# Patient Record
Sex: FEMALE | Race: WHITE | NOT HISPANIC OR LATINO | Employment: UNEMPLOYED | URBAN - METROPOLITAN AREA
[De-identification: names, ages, dates, MRNs, and addresses within clinical notes are randomized per-mention and may not be internally consistent; named-entity substitution may affect disease eponyms.]

---

## 2017-07-27 ENCOUNTER — CONVERSION ENCOUNTER (OUTPATIENT)
Dept: MAMMOGRAPHY | Facility: HOSPITAL | Age: 47
End: 2017-07-27

## 2018-08-13 ENCOUNTER — CONVERSION ENCOUNTER (OUTPATIENT)
Dept: PODIATRY | Facility: CLINIC | Age: 48
End: 2018-08-13

## 2018-08-13 ENCOUNTER — OFFICE VISIT CONVERTED (OUTPATIENT)
Dept: PODIATRY | Facility: CLINIC | Age: 48
End: 2018-08-13
Attending: PODIATRIST

## 2018-08-28 ENCOUNTER — OFFICE VISIT CONVERTED (OUTPATIENT)
Dept: PODIATRY | Facility: CLINIC | Age: 48
End: 2018-08-28
Attending: PODIATRIST

## 2018-12-31 ENCOUNTER — CONVERSION ENCOUNTER (OUTPATIENT)
Dept: ORTHOPEDIC SURGERY | Facility: CLINIC | Age: 48
End: 2018-12-31

## 2018-12-31 ENCOUNTER — OFFICE VISIT CONVERTED (OUTPATIENT)
Dept: ORTHOPEDIC SURGERY | Facility: CLINIC | Age: 48
End: 2018-12-31
Attending: PHYSICIAN ASSISTANT

## 2019-01-17 ENCOUNTER — HOSPITAL ENCOUNTER (OUTPATIENT)
Dept: MAMMOGRAPHY | Facility: HOSPITAL | Age: 49
Discharge: HOME | End: 2019-01-17
Attending: NURSE PRACTITIONER

## 2019-02-15 ENCOUNTER — HOSPITAL ENCOUNTER (OUTPATIENT)
Dept: OTHER | Facility: HOSPITAL | Age: 49
Discharge: HOME | End: 2019-02-15
Attending: NURSE PRACTITIONER

## 2019-02-18 LAB
AMOXICILLIN+CLAV SUSC ISLT: <=2
AMPICILLIN SUSC ISLT: <=2
AMPICILLIN+SULBAC SUSC ISLT: <=2
BACTERIA UR CULT: ABNORMAL
CEFAZOLIN SUSC ISLT: <=4
CEFEPIME SUSC ISLT: <=1
CEFTAZIDIME SUSC ISLT: <=1
CEFTRIAXONE SUSC ISLT: <=1
CEFUROXIME ORAL SUSC ISLT: <=1
CEFUROXIME PARENTER SUSC ISLT: <=1
CIPROFLOXACIN SUSC ISLT: <=0.25
ERTAPENEM SUSC ISLT: <=0.5
GENTAMICIN SUSC ISLT: <=1
LEVOFLOXACIN SUSC ISLT: <=0.12
NITROFURANTOIN SUSC ISLT: <=16
TETRACYCLINE SUSC ISLT: <=1
TMP SMX SUSC ISLT: <=20
TOBRAMYCIN SUSC ISLT: <=1

## 2019-06-05 ENCOUNTER — HOSPITAL ENCOUNTER (OUTPATIENT)
Dept: OTHER | Facility: HOSPITAL | Age: 49
Discharge: HOME | End: 2019-06-05
Attending: NURSE PRACTITIONER

## 2019-06-05 LAB
APPEARANCE UR: ABNORMAL
BILIRUB UR QL: NEGATIVE
COLOR UR: YELLOW
CONV BACTERIA: ABNORMAL
CONV COLLECTION SOURCE (UA): ABNORMAL
CONV HYALINE CASTS IN URINE MICRO: ABNORMAL /[LPF]
CONV UROBILINOGEN IN URINE BY AUTOMATED TEST STRIP: 0.2 {EHRLICHU}/DL (ref 0.1–1)
GLUCOSE UR QL: NEGATIVE MG/DL
HGB UR QL STRIP: ABNORMAL
KETONES UR QL STRIP: NEGATIVE MG/DL
LEUKOCYTE ESTERASE UR QL STRIP: ABNORMAL
NITRITE UR QL STRIP: NEGATIVE
PH UR STRIP.AUTO: 5 [PH] (ref 5–8)
PROT UR QL: NEGATIVE MG/DL
RBC #/AREA URNS HPF: ABNORMAL /[HPF]
SP GR UR: 1.02 (ref 1–1.03)
SQUAMOUS SPT QL MICRO: ABNORMAL /[HPF]
WBC #/AREA URNS HPF: ABNORMAL /[HPF]

## 2019-06-07 LAB — BACTERIA UR CULT: NORMAL

## 2019-09-04 ENCOUNTER — HOSPITAL ENCOUNTER (OUTPATIENT)
Dept: OTHER | Facility: HOSPITAL | Age: 49
Discharge: HOME | End: 2019-09-04

## 2019-09-05 LAB
C TRACH RRNA CVX QL NAA+PROBE: NOT DETECTED
N GONORRHOEA DNA SPEC QL NAA+PROBE: NOT DETECTED

## 2019-09-06 LAB
CONV HEPATITIS B SURFACE AG W CONFIRMATION RE: NEGATIVE
CONV HEPATITIS COMMENT: NORMAL
HBV CORE AB SER DONR QL IA: NEGATIVE
HBV CORE IGM SERPL QL IA: NEGATIVE
HBV E AB SERPL QL IA: NEGATIVE
HBV E AG SERPL QL IA: NEGATIVE
HBV SURFACE AB SER QL: REACTIVE
HCV AB S/CO SERPL IA: <0.1 S/CO RATIO (ref 0–0.9)

## 2019-09-07 LAB
CONV HIV COMBO AG/AB (HIV-1/O/2) WITH REFLEX: NEGATIVE
CONV TREPONEMA PALLIDUM (RPR) WITH FTA-ABS, TP-PA REFLEXES: NON REACTIVE

## 2019-10-07 ENCOUNTER — HOSPITAL ENCOUNTER (OUTPATIENT)
Dept: OTHER | Facility: HOSPITAL | Age: 49
Discharge: HOME | End: 2019-10-07
Attending: NURSE PRACTITIONER

## 2019-10-21 ENCOUNTER — OFFICE VISIT CONVERTED (OUTPATIENT)
Dept: UROLOGY | Facility: CLINIC | Age: 49
End: 2019-10-21
Attending: NURSE PRACTITIONER

## 2020-01-16 ENCOUNTER — HOSPITAL ENCOUNTER (OUTPATIENT)
Dept: OTHER | Facility: HOSPITAL | Age: 50
Discharge: HOME | End: 2020-01-16
Attending: NURSE PRACTITIONER

## 2020-01-18 LAB — BACTERIA UR CULT: NORMAL

## 2020-02-12 ENCOUNTER — HOSPITAL ENCOUNTER (OUTPATIENT)
Dept: URGENT CARE | Facility: CLINIC | Age: 50
Discharge: HOME | End: 2020-02-12

## 2020-08-22 ENCOUNTER — HOSPITAL ENCOUNTER (OUTPATIENT)
Dept: URGENT CARE | Facility: CLINIC | Age: 50
Discharge: HOME | End: 2020-08-22
Attending: FAMILY MEDICINE

## 2020-08-24 ENCOUNTER — OFFICE VISIT CONVERTED (OUTPATIENT)
Dept: SURGERY | Facility: CLINIC | Age: 50
End: 2020-08-24
Attending: SURGERY

## 2020-08-24 LAB — BACTERIA UR CULT: NORMAL

## 2020-10-13 ENCOUNTER — HOSPITAL ENCOUNTER (OUTPATIENT)
Dept: MAMMOGRAPHY | Facility: HOSPITAL | Age: 50
Discharge: HOME | End: 2020-10-13
Attending: NURSE PRACTITIONER

## 2021-04-01 ENCOUNTER — HOSPITAL ENCOUNTER (OUTPATIENT)
Dept: OTHER | Facility: HOSPITAL | Age: 51
Discharge: HOME | End: 2021-04-01
Attending: INTERNAL MEDICINE

## 2021-04-01 LAB — PTH-INTACT SERPL-MCNC: 45.5 PG/ML (ref 11.1–79.5)

## 2021-05-10 NOTE — H&P
"   History and Physical      Patient Name: Faina Braun   Patient ID: [id number]   Sex: Female   YOB: 1970    Primary Care Provider: Charlotte BUCKNER   Referring Provider: Charlotte BUCKNER    Visit Date: August 24, 2020    Provider: Osei Weinstein MD   [facility]   Location Address: [address] [place] [address]  [id number]   Location Phone: [phone]          Chief Complaint  · Skin Lesion      History Of Present Illness  Faina Braun is a 49 year old Other Race,  or  female who presents to the office today as a consult from Charlotte BUCKNER.      Patient has an area at her upper back that is swollen and tender.  Swelling has went down quite a bit after she applied heat pads.  No fevers.  Mild redness at the area of concern.       Past Medical History  Disease Name Date Onset Notes   Cancer --  Ovarian caner per pt, states \"thats why I had a hysterectomy\"   Degeneration of lumbar intervertebral disc 01/12/2015 L5/S1   Depression --  --    Foot pain, left 08/13/2018 --    Foot pain, right 08/13/2018 --    Ingrowing nail 08/28/2018 --    Ingrowing toenail --  --    Insomnia --  --    Lumbago/low back pain --  --    Onychia and paronychia of toe 08/13/2018 --    Urinary leakage --  --          Past Surgical History  Procedure Name Date Notes   Cesarian Section --  --    Hysterectomy --  --    Hysterectomy-Abdominal --  --    Joint Surgery --  --    Leg Surgery --  --    Shoulder surgery --  --          Medication List  Name Date Started Instructions   Cymbalta 30 mg oral capsule,delayed release(/EC) 12/01/2014 [facility]         Allergy List  Allergen Name Date Reaction Notes   CT Contrast Dye --  --  --    I.V. Dye --  --  --    sulfamethoxazole-trimethoprim --  --  --          Family Medical History  Disease Name Relative/Age Notes   Cancer, Unspecified  --    Diabetes, unspecified type Father/   Father  Aunt " "(paternal)   Prostate Cancer Grandfather (paternal)/   Grandfather (paternal)   Family history of colon cancer Grandfather (paternal)/80s   Aunt/70s; Grandfather (paternal)/80s   Family history of breast cancer  Aunt/70s   Osteoporosis Mother/   Mother         Social History  Finding Status Start/Stop Quantity Notes   Alcohol Never --/-- --  drinks no   Alcohol Use Never --/-- --  does not drink   Caffeine Current every day --/-- --  drinks regularly; 1-2 times per day   . --  --/-- --  --    lives with other --  --/-- --  --    Recreational Drug Use Never --/-- --  no   Retired. --  --/-- --  --    Tobacco Current some day --/-- 0.5 PPD current some day smoker, 0.5 pack per day, smoked 21-30 years  current everyday smoker; quit smoking at age 19         Review of Systems  · Constitutional  o Denies  o : fever, chills  · Cardiovascular  o Denies  o : chest pain on exertion  · Respiratory  o Denies  o : shortness of breath, cough  · Gastrointestinal  o Denies  o : nausea, vomiting  · Integument  o Admits  o : skin lesion or lump      Vitals  Date Time BP Position Site L\R Cuff Size HR RR TEMP (F) WT  HT  BMI kg/m2 BSA m2 O2 Sat        08/24/2020 12:54 PM       14  178lbs 6oz 5'  3\" 31.6 1.9           Physical Examination  · Constitutional  o Appearance  o : healthy appearing, alert and in no acute distress, reliable historian  · Head and Face  o Head  o :   § Inspection  § : no visable deformities or lesions  · Eyes  o Conjunctivae  o : clear  o Sclerae  o : clear  · Neck  o Inspection/Palpation  o : normal appearance, no masses, trachea midline  · Respiratory  o Respiratory Effort  o : breathing unlabored, respiratory effort appears normal  o Inspection of Chest  o : normal appearance, no retractions  · Cardiovascular  o Heart  o : regular rate and rhythm  · Gastrointestinal  o Abdominal Examination  o :   § Abdomen  § : soft, nontender, nondistended  · Skin and Subcutaneous Tissue  o General " Inspection  o : At the center of the patient's upper back, there is an area of mild swelling and erythema with a small punctate at the center of the swelling and erythema. The swelling covers an area about 2 cm in size. The area of erythema is located where a tattoo is located.  · Neurologic  o Cranial Nerves  o : no obvious motor deficits  o Sensation  o : no obvious sensory deficits  o Gait and Station  o :   § Gait Screening  § : normal gait, able to stand without diffculty  o Cerebellar Function  o : no obvious abnormalities  · Psychiatric  o Judgement and Insight  o : judgment and insight intact  o Mood and Affect  o : mood normal, affect appropriate          Assessment  · Pre-Surgical Orders     V72.84  · Pain     780.96/R52  location is back  · Inflamed sebaceous cyst     706.2/L72.3  · Preop testing     V72.84/Z01.818    Problems Reconciled  Plan  · Orders  o GENERAL SURGERY (GNSUR) - V72.84 - 09/02/2020  o [facility] Pre-Op Covid-19 Screening (14130) - V72.84, 706.2/L72.3, 780.96/R52, V72.84/Z01.818 - 08/28/2020   at 315  · Medications  o Medications have been Reconciled  o Transition of Care or Provider Policy  · Instructions  o PLAN: excision of inflamed sebaceous cyst from back  o PLEASE SIGN PERMIT FOR: excision of inflamed sebaceous cyst from back  o Anesthesia: MAC  o Outpatient  o O.R. PREP: Per protocol  o IV: Per Anesthesia  o SCD's preoperatively  o *__Cefazolin 2 gram IV on call to OR.  o The indications, options, risks, benefits, and expected outcomes of the planned procedure were discussed with the patient and the patient agrees to proceed.   o Electronically Identified Patient Education Materials Provided Electronically     The patient fully understands that the incision to remove the sebaceous cyst will have to go through one of her tattoos.             Electronically Signed by: Supriya Mario-Luz Maria, -Author on September 9, 2020 11:20:30 AM  Electronically Co-signed by: Osei Weinstein MD  -Reviewer on September 9, 2020 01:56:30 PM

## 2021-05-14 VITALS — HEIGHT: 63 IN | WEIGHT: 178.37 LBS | BODY MASS INDEX: 31.61 KG/M2 | RESPIRATION RATE: 14 BRPM

## 2021-05-15 VITALS
DIASTOLIC BLOOD PRESSURE: 45 MMHG | TEMPERATURE: 98 F | HEART RATE: 86 BPM | SYSTOLIC BLOOD PRESSURE: 107 MMHG | HEIGHT: 63 IN

## 2021-05-15 VITALS — OXYGEN SATURATION: 98 % | BODY MASS INDEX: 32.82 KG/M2 | HEART RATE: 98 BPM | HEIGHT: 63 IN | WEIGHT: 185.25 LBS

## 2021-05-16 VITALS
HEART RATE: 86 BPM | HEIGHT: 63 IN | DIASTOLIC BLOOD PRESSURE: 53 MMHG | OXYGEN SATURATION: 98 % | BODY MASS INDEX: 31.71 KG/M2 | WEIGHT: 179 LBS | SYSTOLIC BLOOD PRESSURE: 113 MMHG

## 2021-05-16 VITALS — WEIGHT: 179 LBS | BODY MASS INDEX: 31.71 KG/M2 | OXYGEN SATURATION: 97 % | HEIGHT: 63 IN | HEART RATE: 90 BPM

## 2021-06-17 ENCOUNTER — TRANSCRIBE ORDERS (OUTPATIENT)
Dept: ADMINISTRATIVE | Facility: HOSPITAL | Age: 51
End: 2021-06-17

## 2021-06-17 DIAGNOSIS — E55.9 VITAMIN D DEFICIENCY: Primary | ICD-10-CM

## 2021-09-21 ENCOUNTER — TRANSCRIBE ORDERS (OUTPATIENT)
Dept: ADMINISTRATIVE | Facility: HOSPITAL | Age: 51
End: 2021-09-21

## 2021-09-21 ENCOUNTER — LAB REQUISITION (OUTPATIENT)
Dept: LAB | Facility: HOSPITAL | Age: 51
End: 2021-09-21

## 2021-09-21 DIAGNOSIS — R31.9 HEMATURIA, UNSPECIFIED: ICD-10-CM

## 2021-09-21 DIAGNOSIS — R35.0 FREQUENCY OF MICTURITION: ICD-10-CM

## 2021-09-21 DIAGNOSIS — Z12.31 ENCOUNTER FOR SCREENING MAMMOGRAM FOR MALIGNANT NEOPLASM OF BREAST: Primary | ICD-10-CM

## 2021-09-21 LAB
BACTERIA UR QL AUTO: ABNORMAL /HPF
BILIRUB UR QL STRIP: NEGATIVE
CLARITY UR: CLEAR
COLOR UR: YELLOW
GLUCOSE UR STRIP-MCNC: NEGATIVE MG/DL
HGB UR QL STRIP.AUTO: ABNORMAL
HYALINE CASTS UR QL AUTO: ABNORMAL /LPF
KETONES UR QL STRIP: NEGATIVE
LEUKOCYTE ESTERASE UR QL STRIP.AUTO: NEGATIVE
NITRITE UR QL STRIP: NEGATIVE
PH UR STRIP.AUTO: <=5 [PH] (ref 5–8)
PROT UR QL STRIP: NEGATIVE
RBC # UR: ABNORMAL /HPF
REF LAB TEST METHOD: ABNORMAL
SP GR UR STRIP: 1.02 (ref 1–1.03)
SQUAMOUS #/AREA URNS HPF: ABNORMAL /HPF
UROBILINOGEN UR QL STRIP: ABNORMAL
WBC UR QL AUTO: ABNORMAL /HPF

## 2021-09-21 PROCEDURE — 81001 URINALYSIS AUTO W/SCOPE: CPT | Performed by: NURSE PRACTITIONER

## 2021-11-30 ENCOUNTER — HOSPITAL ENCOUNTER (OUTPATIENT)
Dept: MAMMOGRAPHY | Facility: HOSPITAL | Age: 51
Discharge: HOME | End: 2021-11-30
Admitting: NURSE PRACTITIONER

## 2021-11-30 DIAGNOSIS — Z12.31 ENCOUNTER FOR SCREENING MAMMOGRAM FOR MALIGNANT NEOPLASM OF BREAST: ICD-10-CM

## 2021-11-30 PROCEDURE — 77063 BREAST TOMOSYNTHESIS BI: CPT

## 2021-11-30 PROCEDURE — 77067 SCR MAMMO BI INCL CAD: CPT

## 2022-05-04 ENCOUNTER — CLINICAL SUPPORT (OUTPATIENT)
Dept: GASTROENTEROLOGY | Facility: CLINIC | Age: 52
End: 2022-05-04

## 2023-01-06 PROCEDURE — 87086 URINE CULTURE/COLONY COUNT: CPT | Performed by: NURSE PRACTITIONER

## 2023-01-06 PROCEDURE — 87088 URINE BACTERIA CULTURE: CPT | Performed by: NURSE PRACTITIONER

## 2023-01-06 PROCEDURE — 87186 SC STD MICRODIL/AGAR DIL: CPT | Performed by: NURSE PRACTITIONER

## 2024-04-18 ENCOUNTER — OFFICE VISIT (OUTPATIENT)
Dept: PLASTIC SURGERY | Facility: CLINIC | Age: 54
End: 2024-04-18

## 2024-04-18 VITALS — HEIGHT: 63 IN | BODY MASS INDEX: 31.33 KG/M2

## 2024-04-18 DIAGNOSIS — R23.8 FACIAL AGING: Primary | ICD-10-CM

## 2024-04-18 RX ORDER — LEVOTHYROXINE SODIUM 0.05 MG/1
1 TABLET ORAL DAILY
COMMUNITY
Start: 2024-02-20

## 2024-04-18 RX ORDER — DULOXETIN HYDROCHLORIDE 30 MG/1
1 CAPSULE, DELAYED RELEASE ORAL DAILY
COMMUNITY
Start: 2024-02-19

## 2024-04-18 RX ORDER — PRAZOSIN HYDROCHLORIDE 2 MG/1
2 CAPSULE ORAL
COMMUNITY
Start: 2024-03-01

## 2024-04-18 NOTE — PROGRESS NOTES
"Chief Complaint  Consult and Botulinum Toxin Injection    Subjective  I want to control the wrinkles and my lips to look spann        History of Present Illness  Faina Braun is a 53 y.o. female who presents to [facility] as a consult for botox injection. She complains of fine lines and wrinkles on forehead and glabella. Has some asymmetry on right side of her upper lip. Has never had botox or  past.       Allergies: Ct contrast, Iodinated contrast media, and Sulfamethoxazole-trimethoprim  Allergies Reconciled.    Review of Systems   Constitutional: Negative.    HENT: Negative.     Eyes:         Glasses   Respiratory: Negative.     Cardiovascular: Negative.    Gastrointestinal: Negative.    Endocrine: Negative.    Genitourinary: Negative.    Musculoskeletal: Negative.    Skin: Negative.    Allergic/Immunologic: Negative.    Neurological: Negative.    Hematological: Negative.    Psychiatric/Behavioral: Negative.        All review of system has been reviewed and it  is negative except the ones note above.     Objective     Ht 160 cm (62.99\")   BMI 31.33 kg/m²     Body mass index is 31.33 kg/m².    Physical Exam   Cardiovascular: Normal rate.     Pulmonary/Chest  Effort normal.     Head and Face  Dynamic greater than static rhytids of forehead, glabella, and periorbital areas, no open areas or irritation     Discussed facial movement, periorbital areas are smooth with minimal smile lines, did not recommend Botox at periorbitals at this time. Discussed treating Forehead and glabella as she has significant muscle contracture in these areas. She is also concerned with NLF and lips. She is very self conscious of lips as upper lip is asymmetric    Result Review :       Skin was cleansed with alcohol and then   1% lidocaine nerve mental nerve block was done before filler injections. She was allowed to numb, Botox was injected to forehead and glabella while " she was getting numb.  Filler Injection    Performed by: No Marks APRN  Authorized by: No Marks APRN       Ultra filler is what was injected.    The indications, benefits, risks, alternatives, expected outcomes and complications in regards to the injection of Ultra filler was discussed with the patient.    1st Ultra Lot #:  [id number]    1st Ultra Expiration date:  11/10/2024    1st Ultra Number of syringes:  1  I then proceeded to inject the Lower lips and Upper lips with filler, taking care to aspirate prior to injection in small aliquots.   Ice packs were applied    Post-procedure instructions were provided     informed consent was obtained   Patient expressed understanding, accepts risks, consent and wishes to proceed  Site prep:  Alcohol  Toleration of procedure:  Well, with no immediate complications, Appropriate bruising and Appropriate swelling  Filler Injection    Performed by: No Marks APRN  Authorized by: No Marks APRN       Ultra Plus filler is what was injected.    The indications, benefits, risks, alternatives, expected outcomes and complications in regards to the injection of Ultra Plus filler was discussed with the patient.    1st Ultra Plus Lot #:  [id number]    1st Ultra Plus Expiration Date:  12/4/2024    1st Ultra Plus Number of syringes:  1  I then proceeded to inject the Prominent NL folds with filler, taking care to aspirate prior to injection in small aliquots.   Ice packs were applied    Post-procedure instructions were provided     informed consent was obtained   Patient expressed understanding, accepts risks, consent and wishes to proceed  Site prep:  Alcohol  Toleration of procedure:  Well, with no immediate complications, Appropriate bruising and Appropriate swelling  Photos were obtained.  The indications, benefits, risks, alternatives, expected outcomes, and complications as to the application of Botulinum toxin/Dysport to the  face was discussed with the patient.  Informed consent was obtained.  They understand, accept risks, consent and wish to proceed.   Description:  In the exam room, patient's face was prepped with alcohol.  Using Botox glabella, and forehead were injected.  The patient tolerated the procedure well with no immediate complications.  Post procedure instructions were given.  After injection, the areas were gently massaged. The patient was given an icepack.  Lot#: [id number]  Exp#: 07/2026  NDC: Botox: 0927-7745-13  Total Units Used: 40 units  Forehead: 20 units  Glabella: 20 units      Assessment and Plan      Diagnoses and all orders for this visit:    1. Facial aging (Primary)      Plan:  The patient tolerated the procedure well with no immediate complications.   The patient was given an ice pack and post procedure instructions.   May call office with any concerns or questions.  Aware the filler sale is to the end of May, she should wait 2-4 weeks before more filler  RTC 12 weeks for next Botox injection  patient instructed to call office for any questions or concerns and verbalized understanding of all instructions given.      Scribed by Dinora Wolfe MA, acting as a scribe for DUDLEY Gonzalez, 04/19/24 14:37 EDT.  DUDLEY Gonzalez's signature on the note affirms that the note adequately documents the care provided.       Patient was given instructions and counseling regarding her condition. Please see specific information pulled into the AVS if appropriate.     Dinora Wolfe MA  04/18/2024

## 2024-04-19 PROBLEM — R23.8 FACIAL AGING: Status: ACTIVE | Noted: 2024-04-19

## 2024-04-26 ENCOUNTER — PATIENT ROUNDING (BHMG ONLY) (OUTPATIENT)
Dept: PLASTIC SURGERY | Facility: CLINIC | Age: 54
End: 2024-04-26
Payer: MEDICARE

## 2024-04-26 ENCOUNTER — PATIENT MESSAGE (OUTPATIENT)
Dept: PLASTIC SURGERY | Facility: CLINIC | Age: 54
End: 2024-04-26
Payer: MEDICARE

## 2024-04-26 NOTE — PROGRESS NOTES
[employer] message has been sent to the patient for PATIENT ROUNDING with [facility].

## 2024-04-30 ENCOUNTER — TELEPHONE (OUTPATIENT)
Dept: PLASTIC SURGERY | Facility: CLINIC | Age: 54
End: 2024-04-30
Payer: MEDICARE

## 2024-04-30 NOTE — TELEPHONE ENCOUNTER
Spoke with pt and walked through steps on sending requested pictures.  Pictures were received via [employer].

## 2024-04-30 NOTE — TELEPHONE ENCOUNTER
Called patient and had to leave voicemail for patient to see if she could send updated pictures through mychart of her lips so we can be prepared to add more filler on Thursday or dissolve it the rest of the way out. Patient is aware to call the office with any concerns.

## 2024-05-01 NOTE — PROGRESS NOTES
Chief Complaint  No chief complaint on file.    Subjective  I want to control the wrinkles and my lips to look spann        History of Present Illness  Faina Braun is a 53 y.o. female who presents to [facility] as a consult for botox injection. She complains of fine lines and wrinkles on forehead and glabella. Has some asymmetry on right side of her upper lip. Has never had botox or  past.       Allergies: Ct contrast, Iodinated contrast media, and Sulfamethoxazole-trimethoprim  Allergies Reconciled.    Review of Systems   Constitutional: Negative.    HENT: Negative.     Eyes:         Glasses   Respiratory: Negative.     Cardiovascular: Negative.    Gastrointestinal: Negative.    Endocrine: Negative.    Genitourinary: Negative.    Musculoskeletal: Negative.    Skin: Negative.    Allergic/Immunologic: Negative.    Neurological: Negative.    Hematological: Negative.    Psychiatric/Behavioral: Negative.        All review of system has been reviewed and it  is negative except the ones note above.     Objective     There were no vitals taken for this visit.    There is no height or weight on file to calculate BMI.    Physical Exam   Cardiovascular: Normal rate.     Pulmonary/Chest  Effort normal.     Head and Face  Dynamic greater than static rhytids of forehead, glabella, and periorbital areas, no open areas or irritation     Discussed facial movement, periorbital areas are smooth with minimal smile lines, did not recommend Botox at periorbitals at this time. Discussed treating Forehead and glabella as she has significant muscle contracture in these areas. She is also concerned with NLF and lips. She is very self conscious of lips as upper lip is asymmetric    Result Review :       Skin was cleansed with alcohol and then   1% lidocaine nerve mental nerve block was done before filler injections. She was allowed to numb, Botox was injected to forehead and  glabella while she was getting numb.  Procedures  Photos were obtained.  The indications, benefits, risks, alternatives, expected outcomes, and complications as to the application of Botulinum toxin/Dysport to the face was discussed with the patient.  Informed consent was obtained.  They understand, accept risks, consent and wish to proceed.   Description:  In the exam room, patient's face was prepped with alcohol.  Using Botox glabella, and forehead were injected.  The patient tolerated the procedure well with no immediate complications.  Post procedure instructions were given.  After injection, the areas were gently massaged. The patient was given an icepack.  Lot#: [id number]  Exp#: 07/2026  NDC: Botox: 2471-9642-26  Total Units Used: 40 units  Forehead: 20 units  Glabella: 20 units      Assessment and Plan      There are no diagnoses linked to this encounter.    Plan:  The patient tolerated the procedure well with no immediate complications.   The patient was given an ice pack and post procedure instructions.   May call office with any concerns or questions.  Aware the filler sale is to the end of May, she should wait 2-4 weeks before more filler  RTC 12 weeks for next Botox injection  patient instructed to call office for any questions or concerns and verbalized understanding of all instructions given.      Scribed by Dinora Wolfe MA, acting as a scribe for DUDLEY Gonzalez, 04/19/24 14:37 EDT.  DUDLEY Gonzalez's signature on the note affirms that the note adequately documents the care provided.       Patient was given instructions and counseling regarding her condition. Please see specific information pulled into the AVS if appropriate.     Rahel Hutchins  05/02/2024

## 2024-05-02 ENCOUNTER — OFFICE VISIT (OUTPATIENT)
Dept: PLASTIC SURGERY | Facility: CLINIC | Age: 54
End: 2024-05-02

## 2024-05-02 VITALS
WEIGHT: 182 LBS | SYSTOLIC BLOOD PRESSURE: 114 MMHG | TEMPERATURE: 98.4 F | DIASTOLIC BLOOD PRESSURE: 71 MMHG | OXYGEN SATURATION: 96 % | HEIGHT: 63 IN | BODY MASS INDEX: 32.25 KG/M2 | HEART RATE: 80 BPM

## 2024-05-02 DIAGNOSIS — R23.8 FACIAL AGING: Primary | ICD-10-CM

## 2024-07-03 ENCOUNTER — APPOINTMENT (OUTPATIENT)
Dept: CT IMAGING | Facility: HOSPITAL | Age: 54
End: 2024-07-03
Payer: MEDICARE

## 2024-07-03 ENCOUNTER — HOSPITAL ENCOUNTER (EMERGENCY)
Facility: HOSPITAL | Age: 54
Discharge: HOME | End: 2024-07-03
Attending: EMERGENCY MEDICINE
Payer: MEDICARE

## 2024-07-03 VITALS
TEMPERATURE: 98.5 F | BODY MASS INDEX: 32.27 KG/M2 | SYSTOLIC BLOOD PRESSURE: 100 MMHG | WEIGHT: 182.1 LBS | HEART RATE: 74 BPM | DIASTOLIC BLOOD PRESSURE: 48 MMHG | RESPIRATION RATE: 16 BRPM | HEIGHT: 63 IN | OXYGEN SATURATION: 99 %

## 2024-07-03 DIAGNOSIS — K62.5 RECTAL BLEEDING: ICD-10-CM

## 2024-07-03 DIAGNOSIS — R10.9 ABDOMINAL PAIN, UNSPECIFIED ABDOMINAL LOCATION: Primary | ICD-10-CM

## 2024-07-03 LAB
ABO GROUP BLD: NORMAL
ABO GROUP BLD: NORMAL
ALBUMIN SERPL-MCNC: 4.8 G/DL (ref 3.5–5.2)
ALBUMIN/GLOB SERPL: 1.5 G/DL
ALP SERPL-CCNC: 140 U/L (ref 39–117)
ALT SERPL W P-5'-P-CCNC: 35 U/L (ref 1–33)
ANION GAP SERPL CALCULATED.3IONS-SCNC: 11.8 MMOL/L (ref 5–15)
AST SERPL-CCNC: 30 U/L (ref 1–32)
BASOPHILS # BLD AUTO: 0.07 10*3/MM3 (ref 0–0.2)
BASOPHILS NFR BLD AUTO: 0.5 % (ref 0–1.5)
BILIRUB SERPL-MCNC: 0.5 MG/DL (ref 0–1.2)
BLD GP AB SCN SERPL QL: NEGATIVE
BUN SERPL-MCNC: 15 MG/DL (ref 6–20)
BUN/CREAT SERPL: 15 (ref 7–25)
CALCIUM SPEC-SCNC: 10.2 MG/DL (ref 8.6–10.5)
CHLORIDE SERPL-SCNC: 103 MMOL/L (ref 98–107)
CO2 SERPL-SCNC: 24.2 MMOL/L (ref 22–29)
CREAT SERPL-MCNC: 1 MG/DL (ref 0.57–1)
DEPRECATED RDW RBC AUTO: 41.8 FL (ref 37–54)
EGFRCR SERPLBLD CKD-EPI 2021: 67.1 ML/MIN/1.73
EOSINOPHIL # BLD AUTO: 0.04 10*3/MM3 (ref 0–0.4)
EOSINOPHIL NFR BLD AUTO: 0.3 % (ref 0.3–6.2)
ERYTHROCYTE [DISTWIDTH] IN BLOOD BY AUTOMATED COUNT: 12.6 % (ref 12.3–15.4)
GLOBULIN UR ELPH-MCNC: 3.2 GM/DL
GLUCOSE SERPL-MCNC: 120 MG/DL (ref 65–99)
HCT VFR BLD AUTO: 43.2 % (ref 34–46.6)
HEMOCCULT STL QL IA: POSITIVE
HGB BLD-MCNC: 14.5 G/DL (ref 12–15.9)
HOLD SPECIMEN: NORMAL
HOLD SPECIMEN: NORMAL
IMM GRANULOCYTES # BLD AUTO: 0.08 10*3/MM3 (ref 0–0.05)
IMM GRANULOCYTES NFR BLD AUTO: 0.5 % (ref 0–0.5)
LYMPHOCYTES # BLD AUTO: 1.04 10*3/MM3 (ref 0.7–3.1)
LYMPHOCYTES NFR BLD AUTO: 6.7 % (ref 19.6–45.3)
MCH RBC QN AUTO: 30.5 PG (ref 26.6–33)
MCHC RBC AUTO-ENTMCNC: 33.6 G/DL (ref 31.5–35.7)
MCV RBC AUTO: 90.8 FL (ref 79–97)
MONOCYTES # BLD AUTO: 0.81 10*3/MM3 (ref 0.1–0.9)
MONOCYTES NFR BLD AUTO: 5.2 % (ref 5–12)
NEUTROPHILS NFR BLD AUTO: 13.43 10*3/MM3 (ref 1.7–7)
NEUTROPHILS NFR BLD AUTO: 86.8 % (ref 42.7–76)
NRBC BLD AUTO-RTO: 0 /100 WBC (ref 0–0.2)
PLATELET # BLD AUTO: 352 10*3/MM3 (ref 140–450)
PMV BLD AUTO: 10.2 FL (ref 6–12)
POTASSIUM SERPL-SCNC: 4.4 MMOL/L (ref 3.5–5.2)
PROT SERPL-MCNC: 8 G/DL (ref 6–8.5)
RBC # BLD AUTO: 4.76 10*6/MM3 (ref 3.77–5.28)
RH BLD: POSITIVE
RH BLD: POSITIVE
SODIUM SERPL-SCNC: 139 MMOL/L (ref 136–145)
T&S EXPIRATION DATE: NORMAL
WBC NRBC COR # BLD AUTO: 15.47 10*3/MM3 (ref 3.4–10.8)
WHOLE BLOOD HOLD COAG: NORMAL
WHOLE BLOOD HOLD SPECIMEN: NORMAL

## 2024-07-03 PROCEDURE — 96374 THER/PROPH/DIAG INJ IV PUSH: CPT

## 2024-07-03 PROCEDURE — 86900 BLOOD TYPING SEROLOGIC ABO: CPT | Performed by: EMERGENCY MEDICINE

## 2024-07-03 PROCEDURE — 82274 ASSAY TEST FOR BLOOD FECAL: CPT | Performed by: EMERGENCY MEDICINE

## 2024-07-03 PROCEDURE — 36415 COLL VENOUS BLD VENIPUNCTURE: CPT

## 2024-07-03 PROCEDURE — 25810000003 SODIUM CHLORIDE 0.9 % SOLUTION: Performed by: EMERGENCY MEDICINE

## 2024-07-03 PROCEDURE — 86900 BLOOD TYPING SEROLOGIC ABO: CPT

## 2024-07-03 PROCEDURE — 86850 RBC ANTIBODY SCREEN: CPT | Performed by: EMERGENCY MEDICINE

## 2024-07-03 PROCEDURE — 86901 BLOOD TYPING SEROLOGIC RH(D): CPT | Performed by: EMERGENCY MEDICINE

## 2024-07-03 PROCEDURE — 96375 TX/PRO/DX INJ NEW DRUG ADDON: CPT

## 2024-07-03 PROCEDURE — 99284 EMERGENCY DEPT VISIT MOD MDM: CPT

## 2024-07-03 PROCEDURE — 86901 BLOOD TYPING SEROLOGIC RH(D): CPT

## 2024-07-03 PROCEDURE — 25010000002 MORPHINE PER 10 MG: Performed by: EMERGENCY MEDICINE

## 2024-07-03 PROCEDURE — 85025 COMPLETE CBC W/AUTO DIFF WBC: CPT | Performed by: EMERGENCY MEDICINE

## 2024-07-03 PROCEDURE — 25010000002 ONDANSETRON PER 1 MG: Performed by: EMERGENCY MEDICINE

## 2024-07-03 PROCEDURE — 74176 CT ABD & PELVIS W/O CONTRAST: CPT

## 2024-07-03 PROCEDURE — 80053 COMPREHEN METABOLIC PANEL: CPT | Performed by: EMERGENCY MEDICINE

## 2024-07-03 RX ORDER — DICYCLOMINE HCL 20 MG
20 TABLET ORAL EVERY 6 HOURS
Qty: 28 TABLET | Refills: 0 | Status: SHIPPED | OUTPATIENT
Start: 2024-07-03 | End: 2024-07-10

## 2024-07-03 RX ORDER — SODIUM CHLORIDE 0.9 % (FLUSH) 0.9 %
10 SYRINGE (ML) INJECTION AS NEEDED
Status: DISCONTINUED | OUTPATIENT
Start: 2024-07-03 | End: 2024-07-03 | Stop reason: HOSPADM

## 2024-07-03 RX ORDER — ONDANSETRON 2 MG/ML
4 INJECTION INTRAMUSCULAR; INTRAVENOUS ONCE
Status: COMPLETED | OUTPATIENT
Start: 2024-07-03 | End: 2024-07-03

## 2024-07-03 RX ORDER — ONDANSETRON 4 MG/1
8 TABLET, ORALLY DISINTEGRATING ORAL EVERY 8 HOURS PRN
Qty: 15 TABLET | Refills: 0 | Status: SHIPPED | OUTPATIENT
Start: 2024-07-03 | End: 2024-07-08

## 2024-07-03 RX ADMIN — SODIUM CHLORIDE 1000 ML: 9 INJECTION, SOLUTION INTRAVENOUS at 14:04

## 2024-07-03 RX ADMIN — ONDANSETRON 4 MG: 2 INJECTION INTRAMUSCULAR; INTRAVENOUS at 14:05

## 2024-07-03 RX ADMIN — MORPHINE SULFATE 4 MG: 4 INJECTION, SOLUTION INTRAMUSCULAR; INTRAVENOUS at 14:05

## 2024-07-03 NOTE — DISCHARGE INSTRUCTIONS
Liquid diet only for the next 24 hours and then advance your diet very slowly as tolerated    Please push oral fluids    Please follow-up with your doctor Friday for serial reexamination the abdomen.      Return to the emergency room immediately for intractable pain, intractable vomiting, fever, shaking chills, muscle aches, near passing out, passing out, heavy rectal bleeding, passing large clots, shortness of breath, unusual fatigue or any new symptoms you are concerned with    Please discuss the need for gastroenterology referral and colonoscopy with your primary care physician at your follow-up appointment to determine etiology of the rectal bleeding

## 2024-07-03 NOTE — ED PROVIDER NOTES
Time: 1:51 PM EDT  Date of encounter:  7/3/2024  Independent Historian/Clinical History and Information was obtained by:   Patient  Chief Complaint: Abdominal pain and rectal bleeding    History is limited by: N/A    History of Present Illness:  Patient is a 53 y.o. year old female who presents to the emergency department for evaluation of abdominal pain and rectal bleeding.  The patient notes that she had a large bowel movement today.  She states that there was bleeding associated with bowel movement.  She states after that time she has been bleeding from the rectum without stool.  She does note left lower quadrant abdominal pain.  She has had nausea but no vomiting.  She has had no fever, rigors or myalgias.  She has no bleeding at this time.  She denies any shortness of breath, near syncope, syncope or unusual fatigue.  She has not had similar symptoms previously    HPI    Patient Care Team  Primary Care Provider: Marii Mcpherson MD    Past Medical History:     Allergies   Allergen Reactions    Ct Contrast GI Intolerance    Iodinated Contrast Media GI Intolerance    Sulfamethoxazole-Trimethoprim Rash and Hives     Past Medical History:   Diagnosis Date    Depression     Hyperlipidemia      Past Surgical History:   Procedure Laterality Date     SECTION      HYSTERECTOMY      SHOULDER ARTHROSCOPY      bilateral     History reviewed. No pertinent family history.    Home Medications:  Prior to Admission medications    Medication Sig Start Date End Date Taking? Authorizing Provider   ascorbic acid (VITAMIN C) 500 MG tablet Vitamin C 500 mg oral tablet take 1 tablet by oral route daily   Suspended    Emergency, Nurse JENN Mayers   atorvastatin (LIPITOR) 20 MG tablet Take 1 tablet by mouth every night at bedtime. 3/11/21   Emergency, Nurse JENN Mayers   buPROPion SR (WELLBUTRIN SR) 150 MG 12 hr tablet Take 1 tablet by mouth 2 (Two) Times a Day. 22   Provider, MD Maryuri   DULoxetine (CYMBALTA) 30 MG  capsule Take 1 capsule by mouth Daily. 2/19/24   Maryuri Sullivan MD   ergocalciferol (ERGOCALCIFEROL) 1.25 MG (96722 UT) capsule ergocalciferol (vitamin D2) 50,000 unit oral capsule take 1 capsule (50,000 unit) by oral route once weekly   Suspended    Nurse Talib Brewer RN   ferrous sulfate 325 (65 FE) MG tablet ferrous sulfate 325 mg (65 mg iron) oral tablet take 1 tablet by oral route 2 times a day   Suspended    Nurse Talib Brewer RN   FLUoxetine (PROzac) 20 MG capsule Prozac 20 mg oral capsule take 1 capsule (20 mg) by oral route once daily   Suspended    Nurse Talib Brewer RN   fluticasone (FLONASE) 50 MCG/ACT nasal spray 1 SPRAY IN EACH NOSTRIL EVERY DAY AS NEEDED 12/20/22   Maryuri Sullivan MD   levothyroxine (SYNTHROID, LEVOTHROID) 50 MCG tablet Take 1 tablet by mouth Daily. 2/20/24   Maryuri Sullivan MD   metFORMIN (GLUCOPHAGE) 500 MG tablet Take 1 tablet by mouth Every 12 (Twelve) Hours. 2/20/24   Maryuri Sullivan MD   multivitamin with minerals tablet tablet multivitamin oral capsule Uses powder packet BID and has tablet BID by oral route   Suspended    Nurse Talib Brewer RN   prazosin (MINIPRESS) 2 MG capsule Take 1 capsule by mouth every night at bedtime. 3/1/24   Maryuri Sullivan MD   vitamin D (ERGOCALCIFEROL) 1.25 MG (62590 UT) capsule capsule Take 1 capsule by mouth 1 (One) Time Per Week. 3/12/21   Nurse Talib Brewer RN        Social History:   Social History     Tobacco Use    Smoking status: Every Day     Current packs/day: 1.00     Types: Cigarettes     Passive exposure: Current    Smokeless tobacco: Never   Vaping Use    Vaping status: Never Used   Substance Use Topics    Alcohol use: Not Currently    Drug use: Never         Review of Systems:  Review of Systems   Constitutional:  Negative for chills, diaphoresis and fever.   HENT:  Negative for congestion, postnasal drip, rhinorrhea and sore throat.    Eyes:  Negative for photophobia.   Respiratory:  " Negative for cough, chest tightness and shortness of breath.    Cardiovascular:  Negative for chest pain, palpitations and leg swelling.   Gastrointestinal:  Positive for abdominal pain, anal bleeding and blood in stool. Negative for diarrhea, nausea and vomiting.   Genitourinary:  Negative for difficulty urinating, dysuria, flank pain, frequency, hematuria and urgency.   Musculoskeletal:  Negative for neck pain and neck stiffness.   Skin:  Negative for pallor and rash.   Neurological:  Negative for dizziness, syncope, weakness, numbness and headaches.   Hematological:  Negative for adenopathy. Does not bruise/bleed easily.   Psychiatric/Behavioral: Negative.          Physical Exam:  /48 (BP Location: Right arm, Patient Position: Sitting)   Pulse 74   Temp 98.5 °F (36.9 °C) (Oral)   Resp 16   Ht 160 cm (63\")   Wt 82.6 kg (182 lb 1.6 oz)   SpO2 99%   Breastfeeding No   BMI 32.26 kg/m²     Physical Exam  Vitals and nursing note reviewed.   Constitutional:       General: She is not in acute distress.     Appearance: Normal appearance. She is not ill-appearing, toxic-appearing or diaphoretic.   HENT:      Head: Normocephalic and atraumatic.      Mouth/Throat:      Mouth: Mucous membranes are moist.   Eyes:      Pupils: Pupils are equal, round, and reactive to light.   Cardiovascular:      Rate and Rhythm: Normal rate and regular rhythm.      Pulses: Normal pulses.           Carotid pulses are 2+ on the right side and 2+ on the left side.       Radial pulses are 2+ on the right side and 2+ on the left side.        Femoral pulses are 2+ on the right side and 2+ on the left side.       Popliteal pulses are 2+ on the right side and 2+ on the left side.        Dorsalis pedis pulses are 2+ on the right side and 2+ on the left side.        Posterior tibial pulses are 2+ on the right side and 2+ on the left side.      Heart sounds: Normal heart sounds. No murmur heard.  Pulmonary:      Effort: Pulmonary effort is " normal. No accessory muscle usage, respiratory distress or retractions.      Breath sounds: Normal breath sounds. No wheezing, rhonchi or rales.   Abdominal:      General: Abdomen is flat. There is no distension.      Palpations: Abdomen is soft. There is no mass or pulsatile mass.      Tenderness: There is abdominal tenderness in the left upper quadrant and left lower quadrant. There is no right CVA tenderness, left CVA tenderness, guarding or rebound.      Comments: No rigidity   Musculoskeletal:         General: No swelling, tenderness or deformity.      Cervical back: Neck supple. No tenderness.      Right lower leg: No edema.      Left lower leg: No edema.   Skin:     General: Skin is warm and dry.      Capillary Refill: Capillary refill takes less than 2 seconds.      Coloration: Skin is not jaundiced or pale.      Findings: No erythema.   Neurological:      General: No focal deficit present.      Mental Status: She is alert and oriented to person, place, and time. Mental status is at baseline.      Cranial Nerves: Cranial nerves 2-12 are intact. No cranial nerve deficit.      Sensory: Sensation is intact. No sensory deficit.      Motor: Motor function is intact. No weakness or pronator drift.      Coordination: Coordination is intact. Coordination normal.   Psychiatric:         Mood and Affect: Mood normal.         Behavior: Behavior normal.                  Procedures:  Procedures      Medical Decision Making:      Comorbidities that affect care:    Depression, hyperlipidemia, hypothyroid    External Notes reviewed:    None      The following orders were placed and all results were independently analyzed by me:  Orders Placed This Encounter   Procedures    CT Abdomen Pelvis Without Contrast    [place] Draw    Comprehensive Metabolic Panel    Occult Blood, Fecal By Immunoassay - Stool, Per Rectum    CBC Auto Differential    NPO Diet NPO Type: Strict NPO    Undress & Gown    Type & Screen    ABO RH Specimen  Verification    Insert Peripheral IV    CBC & Differential    Green Top (Gel)    Lavender Top    Gold Top - SST    Light Blue Top       Medications Given in the Emergency Department:  Medications   sodium chloride 0.9 % flush 10 mL (has no administration in time range)   sodium chloride 0.9 % bolus 1,000 mL (0 mL Intravenous Stopped 7/3/24 1434)   morphine injection 4 mg (4 mg Intravenous Given 7/3/24 1405)   ondansetron (ZOFRAN) injection 4 mg (4 mg Intravenous Given 7/3/24 1405)        ED Course:         Labs:    Lab Results (last 24 hours)       Procedure Component Value Units Date/Time    CBC & Differential [[id number]]  (Abnormal) Collected: 07/03/24 1239    Specimen: Blood Updated: 07/03/24 1250    Narrative:      The following orders were created for panel order CBC & Differential.  Procedure                               Abnormality         Status                     ---------                               -----------         ------                     CBC Auto Differential[[id number]]        Abnormal            Final result                 Please view results for these tests on the individual orders.    Comprehensive Metabolic Panel [[id number]]  (Abnormal) Collected: 07/03/24 1239    Specimen: Blood Updated: 07/03/24 1311     Glucose 120 mg/dL      BUN 15 mg/dL      Creatinine 1.00 mg/dL      Sodium 139 mmol/L      Potassium 4.4 mmol/L      Chloride 103 mmol/L      CO2 24.2 mmol/L      Calcium 10.2 mg/dL      Total Protein 8.0 g/dL      Albumin 4.8 g/dL      ALT (SGPT) 35 U/L      AST (SGOT) 30 U/L      Alkaline Phosphatase 140 U/L      Total Bilirubin 0.5 mg/dL      Globulin 3.2 gm/dL      A/G Ratio 1.5 g/dL      BUN/Creatinine Ratio 15.0     Anion Gap 11.8 mmol/L      eGFR 67.1 mL/min/1.73     Narrative:      GFR Normal >60  Chronic Kidney Disease <60  Kidney Failure <15      CBC Auto Differential [[id number]]  (Abnormal) Collected: 07/03/24 1239    Specimen: Blood Updated: 07/03/24 1250     WBC 15.47  10*3/mm3      RBC 4.76 10*6/mm3      Hemoglobin 14.5 g/dL      Hematocrit 43.2 %      MCV 90.8 fL      MCH 30.5 pg      MCHC 33.6 g/dL      RDW 12.6 %      RDW-SD 41.8 fl      MPV 10.2 fL      Platelets 352 10*3/mm3      Neutrophil % 86.8 %      Lymphocyte % 6.7 %      Monocyte % 5.2 %      Eosinophil % 0.3 %      Basophil % 0.5 %      Immature Grans % 0.5 %      Neutrophils, Absolute 13.43 10*3/mm3      Lymphocytes, Absolute 1.04 10*3/mm3      Monocytes, Absolute 0.81 10*3/mm3      Eosinophils, Absolute 0.04 10*3/mm3      Basophils, Absolute 0.07 10*3/mm3      Immature Grans, Absolute 0.08 10*3/mm3      nRBC 0.0 /100 WBC     Occult Blood, Fecal By Immunoassay - Stool, Per Rectum [[id number]]  (Abnormal) Collected: 07/03/24 1336    Specimen: Stool from Per Rectum Updated: 07/03/24 1352     Occult Blood, Fecal by Immunoassay Positive             Imaging:    CT Abdomen Pelvis Without Contrast    Result Date: 7/3/2024  CT ABDOMEN PELVIS WO CONTRAST Date of Exam: 7/3/2024 2:09 PM EDT Indication: Flank pain, kidney stone suspected abdominal pain flank pain. Comparison: CT abdomen pelvis without contrast 6/16/2019 Technique: Axial CT images were obtained of the abdomen and pelvis without the administration of contrast. Reconstructed coronal and sagittal images were also obtained. Automated exposure control and iterative construction methods were used. Findings: LUNG BASES: There is subsegmental atelectasis of the right middle lobe. LIVER: Unremarkable. BILIARY/GALLBLADDER: There is a large peripherally calcified stone in the gallbladder. SPLEEN:  Unremarkable PANCREAS:  Unremarkable ADRENAL:  Unremarkable KIDNEYS:  Unremarkable parenchyma with no solid mass identified. No obstruction.  No calculus identified. GASTROINTESTINAL/MESENTERY:  No evidence of obstruction nor inflammation.  No evidence of appendicitis. AORTA/IVC:  Normal caliber. RETROPERITONEUM/LYMPH NODES:  Unremarkable REPRODUCTIVE: Hysterectomy. BLADDER:   Unremarkable OSSEUS STRUCTURES: There is advanced degenerative disc disease at L5-S1. There are degenerative changes of the bilateral sacroiliac joints. No fracture or destructive osseous abnormality. Small bilateral fat-containing inguinal hernias.     Impression: 1. No acute CT abnormality of the abdomen or pelvis. Specifically, no evidence of urolithiasis. 2. Cholelithiasis. 3. Hysterectomy. Electronically Signed: Hoda Hamlin MD  7/3/2024 2:27 PM EDT  Workstation ID: [id number]       Differential Diagnosis and Discussion:    Abdominal Pain: Based on the patient's signs and symptoms, I considered abdominal aortic aneurysm, small bowel obstruction, pancreatitis, acute cholecystitis, acute appendecitis, peptic ulcer disease, gastritis, colitis, endocrine disorders, irritable bowel syndrome and other differential diagnosis an etiology of the patient's abdominal pain.    All labs were reviewed and interpreted by me.    MDM  Number of Diagnoses or Management Options  Abdominal pain, unspecified abdominal location  Rectal bleeding  Diagnosis management comments: The patient's CMP was reviewed and shows no abnormalities of critical concern.  Of note, the patient's sodium and potassium are acceptable.  The patient's liver enzymes are unremarkable.  The patient's renal function including creatinine is preserved.  The patient has a normal anion gap.    The patient's white blood cell count was elevated at 15,000.  The patient's CBC was reviewed and shows no abnormalities of critical concern.  Of note, there is no anemia requiring a blood transfusion and the platelet count is acceptable    The patient's fecal occult was positive    CT scan of the abdomen pelvis demonstrates no acute CT abnormalities.    The patient was given morphine, Zofran and a liter of normal saline.  The patient was reassessed.  The patient states that her pain is significantly improved and almost gone.  The patient does not have any rectal bleeding at  this time.  The patient's vital signs are stable.    I will have the patient follow-up with her doctor on Friday for serial reexamination of the abdomen as tomorrow is 4 July.  The patient will also have her doctor recheck a CBC or hemoglobin level at that follow-up appointment.  The patient will also discuss the need for referral to gastroenterology for colonoscopy.    The patient was given very specific instructions on when and why to return to the emergency room.  The patient voiced understanding and felt comfortable with the discharge instructions.  They would return to the emergency room if necessary.  The patient appears appropriate for discharge and outpatient follow-up.         Amount and/or Complexity of Data Reviewed  Clinical lab tests: reviewed  Tests in the radiology section of CPT®: reviewed           Social Determinants of Health:    Patient is independent, reliable, and has access to care.       Disposition and Care Coordination:    Discharged: The patient is suitable and stable for discharge with no need for consideration of admission.    I have explained discharge medications and the need for follow up with the patient/caretakers. This was also printed in the discharge instructions. Patient was discharged with the following medications and follow up:      Medication List        New Prescriptions      dicyclomine 20 MG tablet  Commonly known as: BENTYL  Take 1 tablet by mouth Every 6 (Six) Hours for 7 days.     ondansetron ODT 4 MG disintegrating tablet  Commonly known as: ZOFRAN-ODT  Place 2 tablets on the tongue Every 8 (Eight) Hours As Needed for Nausea or Vomiting for up to 5 days.               Where to Get Your Medications        These medications were sent to [id number] - [place], [place] - [address] AT [facility] OF [address] [facility] & [place] - [phone] [facility] [phone] [address]  [address] [place] [address]      Phone: [phone]   dicyclomine 20 MG  tablet  ondansetron ODT 4 MG disintegrating tablet      Marii Mcpherson MD  [address] [id number] [phone]    On 7/5/2024  Serial reexamination of the abdomen and to recheck your CBC       Final diagnoses:   Abdominal pain, unspecified abdominal location   Rectal bleeding        ED Disposition       ED Disposition   Discharge    Condition   Stable    Comment   --               This medical record created using voice recognition software.             Surya Escobar DO  07/03/24 3021

## 2024-11-19 ENCOUNTER — TRANSCRIBE ORDERS (OUTPATIENT)
Dept: ADMINISTRATIVE | Facility: HOSPITAL | Age: 54
End: 2024-11-19
Payer: MEDICARE

## 2024-11-19 DIAGNOSIS — Z12.31 BREAST CANCER SCREENING BY MAMMOGRAM: Primary | ICD-10-CM

## 2025-04-01 ENCOUNTER — TRANSCRIBE ORDERS (OUTPATIENT)
Dept: ADMINISTRATIVE | Facility: HOSPITAL | Age: 55
End: 2025-04-01
Payer: MEDICARE

## 2025-04-01 DIAGNOSIS — Z12.31 BREAST CANCER SCREENING BY MAMMOGRAM: Primary | ICD-10-CM

## 2025-04-09 ENCOUNTER — HOSPITAL ENCOUNTER (OUTPATIENT)
Dept: MAMMOGRAPHY | Facility: HOSPITAL | Age: 55
Discharge: HOME | End: 2025-04-09
Admitting: INTERNAL MEDICINE
Payer: MEDICARE

## 2025-04-09 DIAGNOSIS — Z12.31 BREAST CANCER SCREENING BY MAMMOGRAM: ICD-10-CM

## 2025-04-09 PROCEDURE — 77063 BREAST TOMOSYNTHESIS BI: CPT

## 2025-04-09 PROCEDURE — 77067 SCR MAMMO BI INCL CAD: CPT

## 2025-06-23 ENCOUNTER — TRANSCRIBE ORDERS (OUTPATIENT)
Dept: ADMINISTRATIVE | Facility: HOSPITAL | Age: 55
End: 2025-06-23
Payer: MEDICARE

## 2025-06-23 ENCOUNTER — LAB (OUTPATIENT)
Facility: HOSPITAL | Age: 55
End: 2025-06-23
Payer: MEDICARE

## 2025-06-23 DIAGNOSIS — E78.2 MULTIPLE-TYPE HYPERLIPIDEMIA: ICD-10-CM

## 2025-06-23 DIAGNOSIS — E55.9 VITAMIN D DEFICIENCY DISEASE: ICD-10-CM

## 2025-06-23 DIAGNOSIS — E11.65 INADEQUATELY CONTROLLED DIABETES MELLITUS: Primary | ICD-10-CM

## 2025-06-23 DIAGNOSIS — E03.9 MYXEDEMA HEART DISEASE: ICD-10-CM

## 2025-06-23 DIAGNOSIS — N91.1 SECONDARY PHYSIOLOGIC AMENORRHEA: ICD-10-CM

## 2025-06-23 DIAGNOSIS — D53.8 OTHER SPECIFIED NUTRITIONAL ANEMIAS: ICD-10-CM

## 2025-06-23 DIAGNOSIS — E29.1 3-OXO-5 ALPHA-STEROID DELTA 4-DEHYDROGENASE DEFICIENCY: ICD-10-CM

## 2025-06-23 DIAGNOSIS — I10 ESSENTIAL HYPERTENSION, MALIGNANT: ICD-10-CM

## 2025-06-23 DIAGNOSIS — I51.9 MYXEDEMA HEART DISEASE: ICD-10-CM

## 2025-06-23 DIAGNOSIS — E11.65 INADEQUATELY CONTROLLED DIABETES MELLITUS: ICD-10-CM

## 2025-06-23 LAB
25(OH)D3 SERPL-MCNC: 45.3 NG/ML (ref 30–100)
ALBUMIN SERPL-MCNC: 4.5 G/DL (ref 3.5–5.2)
ALBUMIN UR-MCNC: 1.3 MG/DL
ALBUMIN/GLOB SERPL: 1.4 G/DL
ALP SERPL-CCNC: 133 U/L (ref 39–117)
ALT SERPL W P-5'-P-CCNC: 27 U/L (ref 1–33)
ANION GAP SERPL CALCULATED.3IONS-SCNC: 14.1 MMOL/L (ref 5–15)
AST SERPL-CCNC: 28 U/L (ref 1–32)
BASOPHILS # BLD AUTO: 0.06 10*3/MM3 (ref 0–0.2)
BASOPHILS NFR BLD AUTO: 0.9 % (ref 0–1.5)
BILIRUB SERPL-MCNC: 0.5 MG/DL (ref 0–1.2)
BUN SERPL-MCNC: 15 MG/DL (ref 6–20)
BUN/CREAT SERPL: 14.2 (ref 7–25)
CALCIUM SPEC-SCNC: 10.2 MG/DL (ref 8.6–10.5)
CHLORIDE SERPL-SCNC: 105 MMOL/L (ref 98–107)
CHOLEST SERPL-MCNC: 164 MG/DL (ref 0–200)
CO2 SERPL-SCNC: 23.9 MMOL/L (ref 22–29)
CREAT SERPL-MCNC: 1.06 MG/DL (ref 0.57–1)
CREAT UR-MCNC: 149 MG/DL
DEPRECATED RDW RBC AUTO: 46.5 FL (ref 37–54)
EGFRCR SERPLBLD CKD-EPI 2021: 62.2 ML/MIN/1.73
EOSINOPHIL # BLD AUTO: 0.16 10*3/MM3 (ref 0–0.4)
EOSINOPHIL NFR BLD AUTO: 2.3 % (ref 0.3–6.2)
ERYTHROCYTE [DISTWIDTH] IN BLOOD BY AUTOMATED COUNT: 13.1 % (ref 12.3–15.4)
ESTRADIOL SERPL HS-MCNC: <5 PG/ML
FOLATE SERPL-MCNC: 19.1 NG/ML (ref 4.78–24.2)
FSH SERPL-ACNC: 99.7 MIU/ML
GLOBULIN UR ELPH-MCNC: 3.3 GM/DL
GLUCOSE SERPL-MCNC: 116 MG/DL (ref 65–99)
HBA1C MFR BLD: 5.5 % (ref 4.8–5.6)
HCT VFR BLD AUTO: 45.2 % (ref 34–46.6)
HDLC SERPL-MCNC: 40 MG/DL (ref 40–60)
HGB BLD-MCNC: 14.6 G/DL (ref 12–15.9)
IMM GRANULOCYTES # BLD AUTO: 0.02 10*3/MM3 (ref 0–0.05)
IMM GRANULOCYTES NFR BLD AUTO: 0.3 % (ref 0–0.5)
IRON 24H UR-MRATE: 93 MCG/DL (ref 37–145)
IRON SATN MFR SERPL: 22 % (ref 20–50)
LDLC SERPL CALC-MCNC: 94 MG/DL (ref 0–100)
LDLC/HDLC SERPL: 2.25 {RATIO}
LH SERPL-ACNC: 56 MIU/ML
LYMPHOCYTES # BLD AUTO: 1.98 10*3/MM3 (ref 0.7–3.1)
LYMPHOCYTES NFR BLD AUTO: 28.3 % (ref 19.6–45.3)
MCH RBC QN AUTO: 30.6 PG (ref 26.6–33)
MCHC RBC AUTO-ENTMCNC: 32.3 G/DL (ref 31.5–35.7)
MCV RBC AUTO: 94.8 FL (ref 79–97)
MICROALBUMIN/CREAT UR: 8.7 MG/G (ref 0–29)
MONOCYTES # BLD AUTO: 0.4 10*3/MM3 (ref 0.1–0.9)
MONOCYTES NFR BLD AUTO: 5.7 % (ref 5–12)
NEUTROPHILS NFR BLD AUTO: 4.38 10*3/MM3 (ref 1.7–7)
NEUTROPHILS NFR BLD AUTO: 62.5 % (ref 42.7–76)
NRBC BLD AUTO-RTO: 0 /100 WBC (ref 0–0.2)
PLATELET # BLD AUTO: 343 10*3/MM3 (ref 140–450)
PMV BLD AUTO: 10.6 FL (ref 6–12)
POTASSIUM SERPL-SCNC: 5 MMOL/L (ref 3.5–5.2)
PROGEST SERPL-MCNC: 0.35 NG/ML
PROT SERPL-MCNC: 7.8 G/DL (ref 6–8.5)
RBC # BLD AUTO: 4.77 10*6/MM3 (ref 3.77–5.28)
RETICS # AUTO: 0.09 10*6/MM3 (ref 0.02–0.13)
RETICS/RBC NFR AUTO: 1.81 % (ref 0.7–1.9)
SODIUM SERPL-SCNC: 143 MMOL/L (ref 136–145)
T3FREE SERPL-MCNC: 2.91 PG/ML (ref 2–4.4)
T4 FREE SERPL-MCNC: 1.22 NG/DL (ref 0.92–1.68)
TIBC SERPL-MCNC: 414 MCG/DL (ref 298–536)
TRANSFERRIN SERPL-MCNC: 278 MG/DL (ref 200–360)
TRIGL SERPL-MCNC: 170 MG/DL (ref 0–150)
TSH SERPL DL<=0.05 MIU/L-ACNC: 1.8 UIU/ML (ref 0.27–4.2)
VIT B12 BLD-MCNC: 467 PG/ML (ref 211–946)
VLDLC SERPL-MCNC: 30 MG/DL (ref 5–40)
WBC NRBC COR # BLD AUTO: 7 10*3/MM3 (ref 3.4–10.8)

## 2025-06-23 PROCEDURE — 84403 ASSAY OF TOTAL TESTOSTERONE: CPT

## 2025-06-23 PROCEDURE — 83001 ASSAY OF GONADOTROPIN (FSH): CPT

## 2025-06-23 PROCEDURE — 82043 UR ALBUMIN QUANTITATIVE: CPT

## 2025-06-23 PROCEDURE — 80061 LIPID PANEL: CPT

## 2025-06-23 PROCEDURE — 84466 ASSAY OF TRANSFERRIN: CPT

## 2025-06-23 PROCEDURE — 83002 ASSAY OF GONADOTROPIN (LH): CPT

## 2025-06-23 PROCEDURE — 85025 COMPLETE CBC W/AUTO DIFF WBC: CPT

## 2025-06-23 PROCEDURE — 84443 ASSAY THYROID STIM HORMONE: CPT

## 2025-06-23 PROCEDURE — 84439 ASSAY OF FREE THYROXINE: CPT

## 2025-06-23 PROCEDURE — 84481 FREE ASSAY (FT-3): CPT

## 2025-06-23 PROCEDURE — 82670 ASSAY OF TOTAL ESTRADIOL: CPT

## 2025-06-23 PROCEDURE — 84402 ASSAY OF FREE TESTOSTERONE: CPT

## 2025-06-23 PROCEDURE — 82746 ASSAY OF FOLIC ACID SERUM: CPT

## 2025-06-23 PROCEDURE — 84144 ASSAY OF PROGESTERONE: CPT

## 2025-06-23 PROCEDURE — 83036 HEMOGLOBIN GLYCOSYLATED A1C: CPT

## 2025-06-23 PROCEDURE — 80053 COMPREHEN METABOLIC PANEL: CPT

## 2025-06-23 PROCEDURE — 85045 AUTOMATED RETICULOCYTE COUNT: CPT

## 2025-06-23 PROCEDURE — 82306 VITAMIN D 25 HYDROXY: CPT

## 2025-06-23 PROCEDURE — 83540 ASSAY OF IRON: CPT

## 2025-06-23 PROCEDURE — 82570 ASSAY OF URINE CREATININE: CPT

## 2025-06-23 PROCEDURE — 82607 VITAMIN B-12: CPT

## 2025-06-23 PROCEDURE — 36415 COLL VENOUS BLD VENIPUNCTURE: CPT

## 2025-06-26 LAB
TESTOST FREE SERPL-MCNC: 1 PG/ML (ref 0–4.2)
TESTOST SERPL-MCNC: 21 NG/DL (ref 4–50)

## 2025-07-30 ENCOUNTER — TRANSCRIBE ORDERS (OUTPATIENT)
Dept: FAMILY MEDICINE CLINIC | Facility: CLINIC | Age: 55
End: 2025-07-30
Payer: MEDICARE

## 2025-08-01 ENCOUNTER — LAB (OUTPATIENT)
Facility: HOSPITAL | Age: 55
End: 2025-08-01
Payer: MEDICARE

## 2025-08-01 ENCOUNTER — TRANSCRIBE ORDERS (OUTPATIENT)
Facility: HOSPITAL | Age: 55
End: 2025-08-01
Payer: MEDICARE

## 2025-08-01 DIAGNOSIS — E03.9 MYXEDEMA HEART DISEASE: ICD-10-CM

## 2025-08-01 DIAGNOSIS — I51.9 MYXEDEMA HEART DISEASE: ICD-10-CM

## 2025-08-01 DIAGNOSIS — E55.9 AVITAMINOSIS D: ICD-10-CM

## 2025-08-01 DIAGNOSIS — E78.2 MIXED HYPERLIPIDEMIA: ICD-10-CM

## 2025-08-01 DIAGNOSIS — Z78.9 AMERICAN DIABETES ASSOCIATION (ADA) 1100 CALORIE DIET: ICD-10-CM

## 2025-08-01 DIAGNOSIS — D50.9 IRON DEFICIENCY ANEMIA, UNSPECIFIED IRON DEFICIENCY ANEMIA TYPE: ICD-10-CM

## 2025-08-01 DIAGNOSIS — E11.65 INADEQUATELY CONTROLLED DIABETES MELLITUS: Primary | ICD-10-CM

## 2025-08-01 DIAGNOSIS — E11.65 INADEQUATELY CONTROLLED DIABETES MELLITUS: ICD-10-CM

## 2025-08-01 LAB
25(OH)D3 SERPL-MCNC: 53.3 NG/ML (ref 30–100)
CORTIS AM PEAK SERPL-MCNC: 19.1 MCG/DL (ref 6.02–18.4)
CRP SERPL-MCNC: <0.3 MG/DL (ref 0–0.5)
ERYTHROCYTE [SEDIMENTATION RATE] IN BLOOD: 8 MM/HR (ref 0–30)
IGG1 SER-MCNC: 1150 MG/DL (ref 700–1600)

## 2025-08-01 PROCEDURE — 82784 ASSAY IGA/IGD/IGG/IGM EACH: CPT

## 2025-08-01 PROCEDURE — 84681 ASSAY OF C-PEPTIDE: CPT

## 2025-08-01 PROCEDURE — 82306 VITAMIN D 25 HYDROXY: CPT

## 2025-08-01 PROCEDURE — 86140 C-REACTIVE PROTEIN: CPT

## 2025-08-01 PROCEDURE — 86364 TISS TRNSGLTMNASE EA IG CLAS: CPT

## 2025-08-01 PROCEDURE — 83525 ASSAY OF INSULIN: CPT

## 2025-08-01 PROCEDURE — 84305 ASSAY OF SOMATOMEDIN: CPT

## 2025-08-01 PROCEDURE — 82024 ASSAY OF ACTH: CPT

## 2025-08-01 PROCEDURE — 82785 ASSAY OF IGE: CPT

## 2025-08-01 PROCEDURE — 36415 COLL VENOUS BLD VENIPUNCTURE: CPT

## 2025-08-01 PROCEDURE — 83003 ASSAY GROWTH HORMONE (HGH): CPT

## 2025-08-01 PROCEDURE — 82533 TOTAL CORTISOL: CPT

## 2025-08-01 PROCEDURE — 85652 RBC SED RATE AUTOMATED: CPT

## 2025-08-02 LAB
INSULIN SERPL-ACNC: 21.4 UIU/ML (ref 2.6–24.9)
TTG IGA SER-ACNC: <2 U/ML (ref 0–3)

## 2025-08-03 LAB
ACTH PLAS-MCNC: 19.3 PG/ML (ref 7.2–63.3)
C PEPTIDE SERPL-MCNC: 5.8 NG/ML (ref 1.1–4.4)

## 2025-08-05 LAB
GH SERPL-MCNC: 0.7 NG/ML (ref 0–10)
IGF-I SERPL-MCNC: 101 NG/ML (ref 65–216)

## 2025-08-06 LAB — IGE SERPL-ACNC: 42 IU/ML (ref 6–495)

## 2025-08-22 ENCOUNTER — LAB (OUTPATIENT)
Dept: LAB | Facility: HOSPITAL | Age: 55
End: 2025-08-22
Payer: MEDICARE

## 2025-08-22 ENCOUNTER — TRANSCRIBE ORDERS (OUTPATIENT)
Dept: ADMINISTRATIVE | Facility: HOSPITAL | Age: 55
End: 2025-08-22
Payer: MEDICARE

## 2025-08-22 DIAGNOSIS — E29.1 TESTICULAR HYPOFUNCTION: Primary | ICD-10-CM
